# Patient Record
Sex: MALE | Race: WHITE | NOT HISPANIC OR LATINO | Employment: FULL TIME | URBAN - METROPOLITAN AREA
[De-identification: names, ages, dates, MRNs, and addresses within clinical notes are randomized per-mention and may not be internally consistent; named-entity substitution may affect disease eponyms.]

---

## 2017-06-07 ENCOUNTER — HOSPITAL ENCOUNTER (EMERGENCY)
Facility: MEDICAL CENTER | Age: 42
End: 2017-06-07
Attending: EMERGENCY MEDICINE
Payer: COMMERCIAL

## 2017-06-07 ENCOUNTER — APPOINTMENT (OUTPATIENT)
Dept: RADIOLOGY | Facility: MEDICAL CENTER | Age: 42
End: 2017-06-07
Attending: EMERGENCY MEDICINE
Payer: COMMERCIAL

## 2017-06-07 VITALS
RESPIRATION RATE: 18 BRPM | SYSTOLIC BLOOD PRESSURE: 144 MMHG | WEIGHT: 241.18 LBS | HEIGHT: 69 IN | OXYGEN SATURATION: 96 % | DIASTOLIC BLOOD PRESSURE: 99 MMHG | BODY MASS INDEX: 35.72 KG/M2 | HEART RATE: 58 BPM | TEMPERATURE: 98.2 F

## 2017-06-07 DIAGNOSIS — M26.622 ARTHRALGIA OF LEFT TEMPOROMANDIBULAR JOINT: ICD-10-CM

## 2017-06-07 DIAGNOSIS — M54.2 NECK PAIN ON LEFT SIDE: ICD-10-CM

## 2017-06-07 DIAGNOSIS — R51.9 HEADACHE, UNSPECIFIED HEADACHE TYPE: ICD-10-CM

## 2017-06-07 PROCEDURE — 72125 CT NECK SPINE W/O DYE: CPT

## 2017-06-07 PROCEDURE — 99284 EMERGENCY DEPT VISIT MOD MDM: CPT

## 2017-06-07 PROCEDURE — 70450 CT HEAD/BRAIN W/O DYE: CPT

## 2017-06-07 RX ORDER — METHYLPREDNISOLONE 4 MG/1
TABLET ORAL
Qty: 1 KIT | Refills: 0 | Status: SHIPPED | OUTPATIENT
Start: 2017-06-07 | End: 2017-11-02

## 2017-06-07 RX ORDER — HYDROCODONE BITARTRATE AND ACETAMINOPHEN 10; 325 MG/1; MG/1
1 TABLET ORAL EVERY 6 HOURS PRN
Qty: 16 TAB | Refills: 0 | Status: SHIPPED | OUTPATIENT
Start: 2017-06-07 | End: 2017-11-02

## 2017-06-07 RX ORDER — CYCLOBENZAPRINE HCL 10 MG
10 TABLET ORAL 3 TIMES DAILY PRN
Qty: 15 TAB | Refills: 1 | Status: SHIPPED | OUTPATIENT
Start: 2017-06-07 | End: 2017-11-02

## 2017-06-07 ASSESSMENT — PAIN SCALES - GENERAL: PAINLEVEL_OUTOF10: 8

## 2017-06-07 NOTE — ED AVS SNAPSHOT
6/7/2017    Dami Brody  [address] [facility] [place] [address]    Dear Dami:    Psychiatric hospital wants to ensure your discharge home is safe and you or your loved ones have had all of your questions answered regarding your care after you leave the hospital.    Below is a list of resources and contact information should you have any questions regarding your hospital stay, follow-up instructions, or active medical symptoms.    Questions or Concerns Regarding… Contact   Medical Questions Related to Your Discharge  (7 days a week, 8am-5pm) Contact a Nurse Care Coordinator   [phone]   Medical Questions Not Related to Your Discharge  (24 hours a day / 7 days a week)  Contact the Nurse Health Line   [phone]    Medications or Discharge Instructions Refer to your discharge packet   or contact your [facility] Primary Care Provider   [phone]   Follow-up Appointment(s) Schedule your appointment via [facility]   or contact Scheduling [phone]   Billing Review your statement via [facility]  or contact Billing [phone]   Medical Records Review your records via [facility]   or contact Medical Records [phone]     You may receive a telephone call within two days of discharge. This call is to make certain you understand your discharge instructions and have the opportunity to have any questions answered. You can also easily access your medical information, test results and upcoming appointments via the [facility] free online health management tool. You can learn more and sign up at [facility]. For assistance setting up your [facility] account, please call [phone].    Once again, we want to ensure your discharge home is safe and that you have a clear understanding of any next steps in your care. If you have any questions or concerns, please do not hesitate to contact us, we are here for you. Thank you for choosing [facility] for your healthcare needs.    Sincerely,    Your [facility] Healthcare Team

## 2017-06-07 NOTE — ED NOTES
"Pt saw dentist this week for c/o of possible dental infection.  Pt states was told does not have any cartilage on Left side of jaw and given referral for oral surgeon.  Pt stated \"I just want to make sure that's what it is.\"   "

## 2017-06-07 NOTE — ED AVS SNAPSHOT
Home Care Instructions                                                                                                                Dami Brody   MRN: [id number]    Department:  [facility], Emergency Dept   Date of Visit:  6/7/2017            [facility], Emergency Dept    [address] [place] [address]    Phone:  [phone]      You were seen by     Daryl Barnes M.D.      Your Diagnosis Was     Arthralgia of left temporomandibular joint     M26.622       Follow-up Information     1. Follow up with Nilton Newman M.D..    Specialty:  Family Medicine    Why:  see the oral surgeon as scheduled.  If still needed, follow up with primary doctor and ear nose throat specialist for recheck.    Contact information    [address]  [address]  "[employer] [id number] [phone]          2. Follow up with Karolina Beasley M.D..    Specialty:  Otolaryngology    Why:  if still needed for evaluation of neck and facial pain following visit to the oral surgeon, call for appointment with ear nose throat specialist    Contact information    [address] [employer] [id number] [phone]        Medication Information     Review all of your home medications and newly ordered medications with your primary doctor and/or pharmacist as soon as possible. Follow medication instructions as directed by your doctor and/or pharmacist.     Please keep your complete medication list with you and share with your physician. Update the information when medications are discontinued, doses are changed, or new medications (including over-the-counter products) are added; and carry medication information at all times in the event of emergency situations.               Medication List      START taking these medications        Instructions    Morning Afternoon Evening Bedtime    cyclobenzaprine 10 MG Tabs   Commonly known as:  FLEXERIL        Take 1 Tab by mouth 3 times a day as needed  for Muscle Spasms.   Dose:  10 mg                        hydrocodone/acetaminophen  MG Tabs   Commonly known as:  NORCO        Take 1 Tab by mouth every 6 hours as needed for Moderate Pain.   Dose:  1 Tab                        MethylPREDNISolone 4 MG Tbpk   Commonly known as:  MEDROL DOSEPAK        Take as directed                          ASK your doctor about these medications        Instructions    Morning Afternoon Evening Bedtime    acetaminophen 500 MG Tabs   Commonly known as:  TYLENOL        Take 1,000 mg by mouth Once. Indications: Pain   Dose:  1000 mg                        albuterol 108 (90 BASE) MCG/ACT Aers inhalation aerosol        Inhale 1-2 Puffs by mouth every 6 hours as needed for Shortness of Breath.   Dose:  1-2 Puff                        fluticasone 50 MCG/ACT nasal spray   Commonly known as:  FLONASE        Spray 1 Spray in nose every day.   Dose:  1 Spray                             Where to Get Your Medications      You can get these medications from any pharmacy     Bring a paper prescription for each of these medications    - cyclobenzaprine 10 MG Tabs  - hydrocodone/acetaminophen  MG Tabs  - MethylPREDNISolone 4 MG Tbpk            Procedures and tests performed during your visit     CT-CSPINE WITHOUT PLUS RECONS    CT-HEAD W/O        Discharge Instructions       Temporomandibular Joint Syndrome  Temporomandibular joint (TMJ) syndrome is a condition that affects the joints between your jaw and your skull. The TMJs are located near your ears and allow your jaw to open and close. These joints and the nearby muscles are involved in all movements of the jaw. People with TMJ syndrome have pain in the area of these joints and muscles. Chewing, biting, or other movements of the jaw can be difficult or painful.  TMJ syndrome can be caused by various things. In many cases, the condition is mild and goes away within a few weeks. For some people, the condition can become a long-term  problem.  CAUSES  Possible causes of TMJ syndrome include:  · Grinding your teeth or clenching your jaw. Some people do this when they are under stress.  · Arthritis.  · Injury to the jaw.  · Head or neck injury.  · Teeth or dentures that are not aligned well.  In some cases, the cause of TMJ syndrome may not be known.  SIGNS AND SYMPTOMS  The most common symptom is an aching pain on the side of the head in the area of the TMJ. Other symptoms may include:  · Pain when moving your jaw, such as when chewing or biting.  · Being unable to open your jaw all the way.  · Making a clicking sound when you open your mouth.  · Headache.  · Earache.  · Neck or shoulder pain.  DIAGNOSIS  Diagnosis can usually be made based on your symptoms, your medical history, and a physical exam. Your health care provider may check the range of motion of your jaw. Imaging tests, such as X-rays or an MRI, are sometimes done. You may need to see your dentist to determine if your teeth and jaw are lined up correctly.  TREATMENT  TMJ syndrome often goes away on its own. If treatment is needed, the options may include:  · Eating soft foods and applying ice or heat.  · Medicines to relieve pain or inflammation.  · Medicines to relax the muscles.  · A splint, bite plate, or mouthpiece to prevent teeth grinding or jaw clenching.  · Relaxation techniques or counseling to help reduce stress.  · Transcutaneous electrical nerve stimulation (TENS). This helps to relieve pain by applying an electrical current through the skin.  · Acupuncture. This is sometimes helpful to relieve pain.  · Jaw surgery. This is rarely needed.  HOME CARE INSTRUCTIONS  · Take medicines only as directed by your health care provider.  · Eat a soft diet if you are having trouble chewing.  · Apply ice to the painful area.  ¨ Put ice in a plastic bag.  ¨ Place a towel between your skin and the bag.  ¨ Leave the ice on for 20 minutes, 2-3 times a day.  · Apply a warm compress to the  painful area as directed.  · Massage your jaw area and perform any jaw stretching exercises as recommended by your health care provider.  · If you were given a mouthpiece or bite plate, wear it as directed.  · Avoid foods that require a lot of chewing. Do not chew gum.  · Keep all follow-up visits as directed by your health care provider. This is important.  SEEK MEDICAL CARE IF:  · You are having trouble eating.  · You have new or worsening symptoms.  SEEK IMMEDIATE MEDICAL CARE IF:  · Your jaw locks open or closed.     This information is not intended to replace advice given to you by your health care provider. Make sure you discuss any questions you have with your health care provider.     Document Released: 09/12/2002 Document Revised: 01/08/2016 Document Reviewed: 07/23/2015  [employer] Interactive Patient Education ©2016 [employer]            Patient Information     Patient Information    Following emergency treatment: all patient requiring follow-up care must return either to a private physician or a clinic if your condition worsens before you are able to obtain further medical attention, please return to the emergency room.     Billing Information    At [facility], we work to make the billing process streamlined for our patients.  Our Representatives are here to answer any questions you may have regarding your hospital bill.  If you have insurance coverage and have supplied your insurance information to us, we will submit a claim to your insurer on your behalf.  Should you have any questions regarding your bill, we can be reached online or by phone as follows:  Online: You are able pay your bills online or live chat with our representatives about any billing questions you may have. We are here to help Monday - Friday from 8:00am to 7:30pm and 9:00am - 12:00pm on Saturdays.  Please visit [email or web address]  for more information.   Phone:  [phone] or  [phone]    Please note that your emergency physician, surgeon, pathologist, radiologist, anesthesiologist, and other specialists are not employed by [facility] and will therefore bill separately for their services.  Please contact them directly for any questions concerning their bills at the numbers below:     Emergency Physician Services:  [phone]  [facility]:  [phone]  [facility]:  [phone]  [facility]:  [phone]    1. Your final bill may vary from the amount quoted upon discharge if all procedures are not complete at that time, or if your doctor has additional procedures of which we are not aware. You will receive an additional bill if you return to the Emergency Department at [facility] for suture removal regardless of the facility of which the sutures were placed.     2. Please arrange for settlement of this account at the emergency registration.    3. All self-pay accounts are due in full at the time of treatment.  If you are unable to meet this obligation then payment is expected within 4-5 days.     4. If you have had radiology studies (CT, X-ray, Ultrasound, MRI), you have received a preliminary result during your emergency department visit. Please contact the radiology department [phone] to receive a copy of your final result. Please discuss the Final result with your primary physician or with the follow up physician provided.     Crisis Hotline:  [place] Crisis Hotline:  8-176-MPXNDZQ or 1-212.862.8726  [place] Crisis Hotline:    1-721.127.1943 or 417-270-4773         ED Discharge Follow Up Questions    1. In order to provide you with very good care, we would like to follow up with a phone call in the next few days.  May we have your permission to contact you?     YES /  NO    2. What is the best phone number to call you? (       )_____-__________    3. What is the best time to call you?      Morning  /  Afternoon  /   Evening                   Patient Signature:  ____________________________________________________________    Date:  ____________________________________________________________

## 2017-06-07 NOTE — ED AVS SNAPSHOT
[employer] Access Code: [email or web address]  Expires: 7/7/2017  6:17 PM    Your email address is not on file at [facility].  Email Addresses are required for you to sign up for [employer], please contact [phone] to verify your personal information and to provide your email address prior to attempting to register for [employer].    Bernard Lewis  [address] [place] [address]    [employer]  A secure, online tool to manage your health information     [facility]’s [employer]® is a secure, online tool that connects you to your personalized health information from the privacy of your home -- day or night - making it very easy for you to manage your healthcare. Once the activation process is completed, you can even access your medical information using the [employer] selene, which is available for free in the Apple Selene store or Google Play store.     To learn more about [employer], visit [email or web address]    There are two levels of access available (as shown below):   My Chart Features  [facility] Primary Care Doctor [facility]  Specialists [facility]  Urgent  Care Non-[facility] Primary Care Doctor   Email your healthcare team securely and privately 24/7 X X X    Manage appointments: schedule your next appointment; view details of past/upcoming appointments X      Request prescription refills. X      View recent personal medical records, including lab and immunizations X X X X   View health record, including health history, allergies, medications X X X X   Read reports about your outpatient visits, procedures, consult and ER notes X X X X   See your discharge summary, which is a recap of your hospital and/or ER visit that includes your diagnosis, lab results, and care plan X X  X     How to register for [employer]:  Once your e-mail address has been verified, follow the following steps to sign up for [employer].     1. Go to  [email or web address]  2. Click on the Sign Up Now box, which takes you to the New Member Sign Up page. You  will need to provide the following information:  a. Enter your [employer] Access Code exactly as it appears at the top of this page. (You will not need to use this code after you’ve completed the sign-up process. If you do not sign up before the expiration date, you must request a new code.)   b. Enter your date of birth.   c. Enter your home email address.   d. Click Submit, and follow the next screen’s instructions.  3. Create a [employer] ID. This will be your [employer] login ID and cannot be changed, so think of one that is secure and easy to remember.  4. Create a [employer] password. You can change your password at any time.  5. Enter your Password Reset Question and Answer. This can be used at a later time if you forget your password.   6. Enter your e-mail address. This allows you to receive e-mail notifications when new information is available in [employer].  7. Click Sign Up. You can now view your health information.    For assistance activating your [employer] account, call [phone]

## 2017-06-07 NOTE — ED NOTES
"Chief Complaint   Patient presents with   • Ear Pain     Left, x 1 month   • Neck Pain     x 1 month   • Back Pain     x 1 month     /88 mmHg  Pulse 75  Temp(Src) 36.8 °C (98.2 °F)  Resp 16  Ht 1.753 m (5' 9\")  Wt 109.4 kg (241 lb 2.9 oz)  BMI 35.60 kg/m2  SpO2 94%    "

## 2017-06-08 NOTE — DISCHARGE INSTRUCTIONS
Temporomandibular Joint Syndrome  Temporomandibular joint (TMJ) syndrome is a condition that affects the joints between your jaw and your skull. The TMJs are located near your ears and allow your jaw to open and close. These joints and the nearby muscles are involved in all movements of the jaw. People with TMJ syndrome have pain in the area of these joints and muscles. Chewing, biting, or other movements of the jaw can be difficult or painful.  TMJ syndrome can be caused by various things. In many cases, the condition is mild and goes away within a few weeks. For some people, the condition can become a long-term problem.  CAUSES  Possible causes of TMJ syndrome include:  · Grinding your teeth or clenching your jaw. Some people do this when they are under stress.  · Arthritis.  · Injury to the jaw.  · Head or neck injury.  · Teeth or dentures that are not aligned well.  In some cases, the cause of TMJ syndrome may not be known.  SIGNS AND SYMPTOMS  The most common symptom is an aching pain on the side of the head in the area of the TMJ. Other symptoms may include:  · Pain when moving your jaw, such as when chewing or biting.  · Being unable to open your jaw all the way.  · Making a clicking sound when you open your mouth.  · Headache.  · Earache.  · Neck or shoulder pain.  DIAGNOSIS  Diagnosis can usually be made based on your symptoms, your medical history, and a physical exam. Your health care provider may check the range of motion of your jaw. Imaging tests, such as X-rays or an MRI, are sometimes done. You may need to see your dentist to determine if your teeth and jaw are lined up correctly.  TREATMENT  TMJ syndrome often goes away on its own. If treatment is needed, the options may include:  · Eating soft foods and applying ice or heat.  · Medicines to relieve pain or inflammation.  · Medicines to relax the muscles.  · A splint, bite plate, or mouthpiece to prevent teeth grinding or jaw  clenching.  · Relaxation techniques or counseling to help reduce stress.  · Transcutaneous electrical nerve stimulation (TENS). This helps to relieve pain by applying an electrical current through the skin.  · Acupuncture. This is sometimes helpful to relieve pain.  · Jaw surgery. This is rarely needed.  HOME CARE INSTRUCTIONS  · Take medicines only as directed by your health care provider.  · Eat a soft diet if you are having trouble chewing.  · Apply ice to the painful area.  ¨ Put ice in a plastic bag.  ¨ Place a towel between your skin and the bag.  ¨ Leave the ice on for 20 minutes, 2-3 times a day.  · Apply a warm compress to the painful area as directed.  · Massage your jaw area and perform any jaw stretching exercises as recommended by your health care provider.  · If you were given a mouthpiece or bite plate, wear it as directed.  · Avoid foods that require a lot of chewing. Do not chew gum.  · Keep all follow-up visits as directed by your health care provider. This is important.  SEEK MEDICAL CARE IF:  · You are having trouble eating.  · You have new or worsening symptoms.  SEEK IMMEDIATE MEDICAL CARE IF:  · Your jaw locks open or closed.     This information is not intended to replace advice given to you by your health care provider. Make sure you discuss any questions you have with your health care provider.     Document Released: 09/12/2002 Document Revised: 01/08/2016 Document Reviewed: 07/23/2015  [employer] Interactive Patient Education ©2016 [employer]

## 2017-06-08 NOTE — ED PROVIDER NOTES
ED Provider Note    CHIEF COMPLAINT  Chief Complaint   Patient presents with   • Ear Pain     Left, x 1 month   • Neck Pain     x 1 month   • Back Pain     x 1 month       HPI  Dami Brody is a 42 y.o. male who presents complaining of pain greatest over the left TMJ.  He states he saw a dentist as he thought he may have infected tooth.  He states that he received a panoramic mandibular x-ray which showed severe disease of the left TMJ however no evidence of abscess.  Patient states the dentist told him he needed to see an oral surgeon for his problem and this appointment is a week from tomorrow.  Patient has had 2 weeks of rolling left-sided neck pain, muscle spasm, as well as left-sided headaches.  No vision change.  No numbness or weakness of extremities.  He denies clumsiness with his hands.  No chest pain, no pleurisy.  Patient is willing to follow up with the oral surgeon but stated he wanted another evaluation this evening.    REVIEW OF SYSTEMS  Constitutional: No fever  Respiratory: No shortness of breath  ENT left facial pain  Neurologic: Headache  Gastrointestinal: No abdominal pain  Musculoskeletal: Left-sided neck pain    PAST MEDICAL HISTORY  History reviewed. No pertinent past medical history.    FAMILY HISTORY  History reviewed. No pertinent family history.    SOCIAL HISTORY  Social History     Social History   • Marital Status:      Spouse Name: N/A   • Number of Children: N/A   • Years of Education: N/A     Social History Main Topics   • Smoking status: Never Smoker    • Smokeless tobacco: Never Used   • Alcohol Use: Yes      Comment: Occasionally   • Drug Use: No   • Sexual Activity: Not Asked     Other Topics Concern   • None     Social History Narrative       SURGICAL HISTORY  Past Surgical History   Procedure Laterality Date   • Appendectomy         CURRENT MEDICATIONS  No current facility-administered medications on file prior to encounter.     Current Outpatient Prescriptions on File  "Prior to Encounter   Medication Sig Dispense Refill   • albuterol 108 (90 BASE) MCG/ACT Aero Soln inhalation aerosol Inhale 1-2 Puffs by mouth every 6 hours as needed for Shortness of Breath.     • fluticasone (FLONASE) 50 MCG/ACT nasal spray Spray 1 Spray in nose every day.     • acetaminophen (TYLENOL) 500 MG Tab Take 1,000 mg by mouth Once. Indications: Pain         ALLERGIES  Allergies   Allergen Reactions   • Clindamycin    • Erythromycin    • Septra [Bactrim]    • Sulfa Drugs    • Tramadol      \"makes me feel sick & disoriented\"       PHYSICAL EXAM  VITAL SIGNS: /99 mmHg  Pulse 58  Temp(Src) 36.8 °C (98.2 °F)  Resp 18  Ht 1.753 m (5' 9\")  Wt 109.4 kg (241 lb 2.9 oz)  BMI 35.60 kg/m2  SpO2 96%  Constitutional:  Well nourished, No acute distress.   HENT: Tenderness over the left TMJ.  No external swelling.  No dental tenderness.  Gingiva normal.  Posterior pharynx normal.  Lymphatics: No submandibular adenopathy  Eyes:  Conjunctiva normal, No discharge.    Cardiovascular: The heart is regular, no murmurs  Pulmonary: Lungs are clear, good air movement  Skin: No cyanosis.   Musculoskeletal: Neck nontender   Neurologic: speech is clear, no ataxia .  Facial expression normal.  Facial sensation is normal.  Strength and sensation normal in the extremities  Psychiatric:  Mood normal.       CT-CSPINE WITHOUT PLUS RECONS   Final Result         1.  Negative CT scan of the cervical spine.  No fracture or subluxation.      2.  No degenerative change.      3.  Minimal cervical scoliosis convex left which may be secondary to spasm.      CT-HEAD W/O   Final Result      No evidence of acute intracranial process.            COURSE & MEDICAL DECISION MAKING  Pertinent Labs & Imaging studies reviewed. (See chart for details)  Patient some muscular spasm, pain is now been ongoing for a month and certainly the changes told to him by his dentist are likely chronic in nature, recently worsening.  Patient will continue to " take hydrocodone.  He has been prescribed a Medrol Dosepak as well as Flexeril for muscle spasm.  No fever, no flexion or extension of pain or stiffness, there is rotational stiffness and pain to the left lateral neck, unlikely meningitis.  Patient requested and has received referral to ENT if evaluation by oral surgeon is unremarkable for ongoing workup.    FINAL IMPRESSION     1. Arthralgia of left temporomandibular joint    2. Headache, unspecified headache type    3. Neck pain on left side                 Electronically signed by: Daryl Barnes, 6/7/2017 10:07 PM

## 2017-06-08 NOTE — ED NOTES
Discharged home in good condition with prescriptions and follow up instructions, pt verbalizes understanding of all, ambulates out with steady gait accompanied by self.  Pt instructed not to drive while taking narcotics, verbalizes understanding.

## 2017-11-02 ENCOUNTER — OFFICE VISIT (OUTPATIENT)
Dept: URGENT CARE | Facility: CLINIC | Age: 42
End: 2017-11-02
Payer: COMMERCIAL

## 2017-11-02 VITALS
SYSTOLIC BLOOD PRESSURE: 126 MMHG | TEMPERATURE: 98.2 F | RESPIRATION RATE: 18 BRPM | WEIGHT: 235 LBS | BODY MASS INDEX: 34.8 KG/M2 | OXYGEN SATURATION: 97 % | DIASTOLIC BLOOD PRESSURE: 74 MMHG | HEART RATE: 86 BPM | HEIGHT: 69 IN

## 2017-11-02 DIAGNOSIS — E66.9 OBESITY (BMI 30-39.9): ICD-10-CM

## 2017-11-02 DIAGNOSIS — L73.9 FOLLICULITIS: Primary | ICD-10-CM

## 2017-11-02 PROCEDURE — 99204 OFFICE O/P NEW MOD 45 MIN: CPT | Performed by: PHYSICIAN ASSISTANT

## 2017-11-02 RX ORDER — CEPHALEXIN 500 MG/1
500 CAPSULE ORAL 3 TIMES DAILY
Qty: 30 CAP | Refills: 0 | Status: SHIPPED | OUTPATIENT
Start: 2017-11-02 | End: 2017-11-12

## 2017-11-02 RX ORDER — METHYLPREDNISOLONE 4 MG/1
TABLET ORAL
Qty: 21 TAB | Refills: 0 | Status: SHIPPED | OUTPATIENT
Start: 2017-11-02 | End: 2019-09-22

## 2017-11-02 ASSESSMENT — PAIN SCALES - GENERAL: PAINLEVEL: 6=MODERATE PAIN

## 2017-11-03 NOTE — PROGRESS NOTES
Subjective:      Pt is a 42 y.o. male who presents with Rash (pimples and itching through out the entire scalp, feeling tingling sensation )            HPI  Pt notes 7-10 days of red, itchy pimples across entire scalp region wherever there is hair but not in areas without hair. Pt notes this has recurred in the past at this time of year but is unaware of the trigger. Pt has not taken any Rx medications for this condition. Pt states the pain is a 5/10 with itching, aching in nature and worse at night. Pt denies CP, SOB, NVD, paresthesias, headaches, dizziness, change in vision, hives, or other joint pain. The pt's medication list, problem list, and allergies have been evaluated and reviewed during today's visit.    PMH:  Negative per pt.      PSH:  Past Surgical History:   Procedure Laterality Date   • APPENDECTOMY         Fam Hx:  the patient's family history is not pertinent to their current complaint      Soc HX:  Social History     Social History   • Marital status:      Spouse name: N/A   • Number of children: N/A   • Years of education: N/A     Occupational History   • Not on file.     Social History Main Topics   • Smoking status: Never Smoker   • Smokeless tobacco: Never Used   • Alcohol use Yes      Comment: Occasionally   • Drug use: No   • Sexual activity: Not on file     Other Topics Concern   • Not on file     Social History Narrative   • No narrative on file         Medications:    Current Outpatient Prescriptions:   •  ESOMEPRAZOLE MAGNESIUM PO, Take  by mouth., Disp: , Rfl:   •  MethylPREDNISolone (MEDROL DOSEPAK) 4 MG Tablet Therapy Pack, Use as directed, Disp: 21 Tab, Rfl: 0  •  cephALEXin (KEFLEX) 500 MG Cap, Take 1 Cap by mouth 3 times a day for 10 days., Disp: 30 Cap, Rfl: 0  •  albuterol 108 (90 BASE) MCG/ACT Aero Soln inhalation aerosol, Inhale 1-2 Puffs by mouth every 6 hours as needed for Shortness of Breath., Disp: , Rfl:   •  fluticasone (FLONASE) 50 MCG/ACT nasal spray, Spray 1  "Spray in nose every day., Disp: , Rfl:   •  acetaminophen (TYLENOL) 500 MG Tab, Take 1,000 mg by mouth Once. Indications: Pain, Disp: , Rfl:       Allergies:  Clindamycin; Erythromycin; Septra [bactrim]; Sulfa drugs; and Tramadol      ROS  Constitutional: Negative for fever, chills and malaise/fatigue.   HENT: Negative for congestion and sore throat.    Eyes: Negative for blurred vision, double vision and photophobia.   Respiratory: Negative for cough and shortness of breath.    Cardiovascular: Negative for chest pain and palpitations.   Gastrointestinal: Negative for heartburn, nausea, vomiting, abdominal pain, diarrhea and constipation.   Genitourinary: Negative for dysuria and flank pain.   Musculoskeletal: Negative for joint pain and myalgias.   Skin: POS for scalp itching and rash.   Neurological: Negative for dizziness, tingling and headaches.   Endo/Heme/Allergies: Does not bruise/bleed easily.   Psychiatric/Behavioral: Negative for depression. The patient is not nervous/anxious.           Objective:     /74   Pulse 86   Temp 36.8 °C (98.2 °F)   Resp 18   Ht 1.753 m (5' 9\")   Wt 106.6 kg (235 lb)   SpO2 97%   BMI 34.70 kg/m²      Physical Exam   HENT:   Head:             Constitutional: PT is oriented to person, place, and time. PT appears well-developed and well-nourished. No distress.   HENT:   Mouth/Throat: Oropharynx is clear and moist. No oropharyngeal exudate.   Eyes: Conjunctivae normal and EOM are normal. Pupils are equal, round, and reactive to light.   Neck: Normal range of motion. Neck supple. No thyromegaly present.   Cardiovascular: Normal rate, regular rhythm, normal heart sounds and intact distal pulses.  Exam reveals no gallop and no friction rub.    No murmur heard.  Pulmonary/Chest: Effort normal and breath sounds normal. No respiratory distress. PT has no wheezes. PT has no rales. Pt exhibits no tenderness.   Abdominal: Soft. Bowel sounds are normal. PT exhibits no distension " and no mass. There is no tenderness. There is no rebound and no guarding.   Musculoskeletal: Normal range of motion. PT exhibits no edema and no tenderness.   Neurological: PT is alert and oriented to person, place, and time. PT has normal reflexes. No cranial nerve deficit.   Skin: Skin is warm and dry. No rash noted. PT is not diaphoretic. No erythema.       Psychiatric: PT has a normal mood and affect. PT behavior is normal. Judgment and thought content normal.          Assessment/Plan:     1. Folliculitis, scalp    - MethylPREDNISolone (MEDROL DOSEPAK) 4 MG Tablet Therapy Pack; Use as directed  Dispense: 21 Tab; Refill: 0  - cephALEXin (KEFLEX) 500 MG Cap; Take 1 Cap by mouth 3 times a day for 10 days.  Dispense: 30 Cap; Refill: 0    2. Obesity (BMI 30-39.9)    Based on the electronic medical record calculations for BMI: the pt was diagnosed with obesity. Obesity counseling discussed concerning diet and exercise improvements. Pt was in full understanding with information given and plan set forth.    Might be form of Hidradenitis suppurativa     This was discussed with the pt.  Rest, fluids encouraged.  AVS with medical info given.  Pt was in full understanding and agreement with the plan.  Follow-up as needed if symptoms worsen or fail to improve.

## 2017-11-03 NOTE — PATIENT INSTRUCTIONS
Hidradenitis Suppurativa  Hidradenitis suppurativa is a long-term (chronic) skin disease that starts with blocked sweat glands or hair follicles. Bacteria may grow in these blocked openings of your skin. Hidradenitis suppurativa is like a severe form of acne that develops in areas of your body where acne would be unusual. It is most likely to affect the areas of your body where skin rubs against skin and becomes moist. This includes your:  · Underarms.  · Groin.  · Genital areas.  · Buttocks.  · Upper thighs.  · Breasts.  Hidradenitis suppurativa may start out with small pimples. The pimples can develop into deep sores that break open (rupture) and drain pus. Over time your skin may thicken and become scarred. Hidradenitis suppurativa cannot be passed from person to person.   CAUSES   The exact cause of hidradenitis suppurativa is not known. This condition may be due to:  · Male and female hormones. The condition is rare before and after puberty.  · An overactive body defense system (immune system). Your immune system may overreact to the blocked hair follicles or sweat glands and cause swelling and pus-filled sores.  RISK FACTORS  You may have a higher risk of hidradenitis suppurativa if you:  · Are a woman.  · Are between ages 11 and 55.  · Have a family history of hidradenitis suppurativa.  · Have a personal history of acne.  · Are overweight.  · Smoke.  · Take the drug lithium.  SIGNS AND SYMPTOMS   The first signs of an outbreak are usually painful skin bumps that look like pimples. As the condition progresses:  · Skin bumps may get bigger and grow deeper into the skin.  · Bumps under the skin may rupture and drain smelly pus.  · Skin may become itchy and infected.  · Skin may thicken and scar.  · Drainage may continue through tunnels under the skin (fistulas).  · Walking and moving your arms can become painful.  DIAGNOSIS   Your health care provider may diagnose hidradenitis suppurativa based on your medical  history and your signs and symptoms. A physical exam will also be done. You may need to see a health care provider who specializes in skin diseases (dermatologist). You may also have tests done to confirm the diagnosis. These can include:  · Swabbing a sample of pus or drainage from your skin so it can be sent to the lab and tested for infection.  · Blood tests to check for infection.  TREATMENT   The same treatment will not work for everybody with hidradenitis suppurativa. Your treatment will depend on how severe your symptoms are. You may need to try several treatments to find what works best for you. Part of your treatment may include cleaning and bandaging (dressing) your wounds. You may also have to take medicines, such as the following:  · Antibiotics.  · Acne medicines.  · Medicines to block or suppress the immune system.  · A diabetes medicine (metformin) is sometimes used to treat this condition.  · For women, birth control pills can sometimes help relieve symptoms.  You may need surgery if you have a severe case of hidradenitis suppurativa that does not respond to medicine. Surgery may involve:   · Using a laser to clear the skin and remove hair follicles.  · Opening and draining deep sores.  · Removing the areas of skin that are diseased and scarred.  HOME CARE INSTRUCTIONS  · Learn as much as you can about your disease, and work closely with your health care providers.  · Take medicines only as directed by your health care provider.  · If you were prescribed an antibiotic medicine, finish it all even if you start to feel better.  · If you are overweight, losing weight may be very helpful. Try to reach and maintain a healthy weight.  · Do not use any tobacco products, including cigarettes, chewing tobacco, or electronic cigarettes. If you need help quitting, ask your health care provider.  · Do not shave the areas where you get hidradenitis suppurativa.  · Do not wear deodorant.  · Wear loose-fitting  clothes.  · Try not to overheat and get sweaty.  · Take a daily bleach bath as directed by your health care provider.  ¨ Fill your bathtub residential with water.  ¨ Pour in ½ cup of unscented household bleach.  ¨ Soak for 5-10 minutes.  · Cover sore areas with a warm, clean washcloth (compress) for 5-10 minutes.  SEEK MEDICAL CARE IF:   · You have a flare-up of hidradenitis suppurativa.  · You have chills or a fever.  · You are having trouble controlling your symptoms at home.     This information is not intended to replace advice given to you by your health care provider. Make sure you discuss any questions you have with your health care provider.     Document Released: 08/01/2005 Document Revised: 01/08/2016 Document Reviewed: 03/20/2015  [employer] Interactive Patient Education ©2016 [employer]

## 2019-09-22 ENCOUNTER — APPOINTMENT (OUTPATIENT)
Dept: RADIOLOGY | Facility: MEDICAL CENTER | Age: 44
End: 2019-09-22
Attending: EMERGENCY MEDICINE
Payer: COMMERCIAL

## 2019-09-22 ENCOUNTER — HOSPITAL ENCOUNTER (EMERGENCY)
Facility: MEDICAL CENTER | Age: 44
End: 2019-09-22
Attending: EMERGENCY MEDICINE
Payer: COMMERCIAL

## 2019-09-22 VITALS
OXYGEN SATURATION: 94 % | HEIGHT: 69 IN | HEART RATE: 77 BPM | TEMPERATURE: 98 F | BODY MASS INDEX: 39.18 KG/M2 | WEIGHT: 264.55 LBS | RESPIRATION RATE: 17 BRPM | DIASTOLIC BLOOD PRESSURE: 74 MMHG | SYSTOLIC BLOOD PRESSURE: 135 MMHG

## 2019-09-22 DIAGNOSIS — R10.9 FLANK PAIN: ICD-10-CM

## 2019-09-22 LAB
ALBUMIN SERPL BCP-MCNC: 4.4 G/DL (ref 3.2–4.9)
ALBUMIN/GLOB SERPL: 1.6 G/DL
ALP SERPL-CCNC: 58 U/L (ref 30–99)
ALT SERPL-CCNC: 18 U/L (ref 2–50)
ANION GAP SERPL CALC-SCNC: 14 MMOL/L (ref 0–11.9)
APPEARANCE UR: CLEAR
AST SERPL-CCNC: 13 U/L (ref 12–45)
BASOPHILS # BLD AUTO: 0.7 % (ref 0–1.8)
BASOPHILS # BLD: 0.06 K/UL (ref 0–0.12)
BILIRUB SERPL-MCNC: 0.4 MG/DL (ref 0.1–1.5)
BILIRUB UR QL STRIP.AUTO: NEGATIVE
BUN SERPL-MCNC: 17 MG/DL (ref 8–22)
CALCIUM SERPL-MCNC: 9.4 MG/DL (ref 8.4–10.2)
CHLORIDE SERPL-SCNC: 104 MMOL/L (ref 96–112)
CO2 SERPL-SCNC: 22 MMOL/L (ref 20–33)
COLOR UR: YELLOW
CREAT SERPL-MCNC: 0.77 MG/DL (ref 0.5–1.4)
EOSINOPHIL # BLD AUTO: 0.25 K/UL (ref 0–0.51)
EOSINOPHIL NFR BLD: 2.8 % (ref 0–6.9)
ERYTHROCYTE [DISTWIDTH] IN BLOOD BY AUTOMATED COUNT: 39.1 FL (ref 35.9–50)
GLOBULIN SER CALC-MCNC: 2.7 G/DL (ref 1.9–3.5)
GLUCOSE SERPL-MCNC: 131 MG/DL (ref 65–99)
GLUCOSE UR STRIP.AUTO-MCNC: NEGATIVE MG/DL
HCT VFR BLD AUTO: 43.3 % (ref 42–52)
HGB BLD-MCNC: 14.6 G/DL (ref 14–18)
IMM GRANULOCYTES # BLD AUTO: 0.05 K/UL (ref 0–0.11)
IMM GRANULOCYTES NFR BLD AUTO: 0.6 % (ref 0–0.9)
KETONES UR STRIP.AUTO-MCNC: NEGATIVE MG/DL
LEUKOCYTE ESTERASE UR QL STRIP.AUTO: NEGATIVE
LIPASE SERPL-CCNC: 21 U/L (ref 7–58)
LYMPHOCYTES # BLD AUTO: 2.31 K/UL (ref 1–4.8)
LYMPHOCYTES NFR BLD: 26 % (ref 22–41)
MCH RBC QN AUTO: 28.4 PG (ref 27–33)
MCHC RBC AUTO-ENTMCNC: 33.7 G/DL (ref 33.7–35.3)
MCV RBC AUTO: 84.2 FL (ref 81.4–97.8)
MICRO URNS: NORMAL
MONOCYTES # BLD AUTO: 0.51 K/UL (ref 0–0.85)
MONOCYTES NFR BLD AUTO: 5.7 % (ref 0–13.4)
NEUTROPHILS # BLD AUTO: 5.72 K/UL (ref 1.82–7.42)
NEUTROPHILS NFR BLD: 64.2 % (ref 44–72)
NITRITE UR QL STRIP.AUTO: NEGATIVE
NRBC # BLD AUTO: 0 K/UL
NRBC BLD-RTO: 0 /100 WBC
PH UR STRIP.AUTO: 5.5 [PH] (ref 5–8)
PLATELET # BLD AUTO: 257 K/UL (ref 164–446)
PMV BLD AUTO: 9.8 FL (ref 9–12.9)
POTASSIUM SERPL-SCNC: 4.1 MMOL/L (ref 3.6–5.5)
PROT SERPL-MCNC: 7.1 G/DL (ref 6–8.2)
PROT UR QL STRIP: NEGATIVE MG/DL
RBC # BLD AUTO: 5.14 M/UL (ref 4.7–6.1)
RBC UR QL AUTO: NEGATIVE
SODIUM SERPL-SCNC: 140 MMOL/L (ref 135–145)
SP GR UR STRIP.AUTO: 1.02
WBC # BLD AUTO: 8.9 K/UL (ref 4.8–10.8)

## 2019-09-22 PROCEDURE — 700111 HCHG RX REV CODE 636 W/ 250 OVERRIDE (IP): Performed by: EMERGENCY MEDICINE

## 2019-09-22 PROCEDURE — 85025 COMPLETE CBC W/AUTO DIFF WBC: CPT

## 2019-09-22 PROCEDURE — 74177 CT ABD & PELVIS W/CONTRAST: CPT

## 2019-09-22 PROCEDURE — 700117 HCHG RX CONTRAST REV CODE 255: Performed by: EMERGENCY MEDICINE

## 2019-09-22 PROCEDURE — 700102 HCHG RX REV CODE 250 W/ 637 OVERRIDE(OP): Performed by: EMERGENCY MEDICINE

## 2019-09-22 PROCEDURE — A9270 NON-COVERED ITEM OR SERVICE: HCPCS | Performed by: EMERGENCY MEDICINE

## 2019-09-22 PROCEDURE — 81003 URINALYSIS AUTO W/O SCOPE: CPT

## 2019-09-22 PROCEDURE — 83690 ASSAY OF LIPASE: CPT

## 2019-09-22 PROCEDURE — 80053 COMPREHEN METABOLIC PANEL: CPT

## 2019-09-22 PROCEDURE — 96374 THER/PROPH/DIAG INJ IV PUSH: CPT

## 2019-09-22 PROCEDURE — 99285 EMERGENCY DEPT VISIT HI MDM: CPT

## 2019-09-22 RX ORDER — HYDROCODONE BITARTRATE AND ACETAMINOPHEN 10; 325 MG/1; MG/1
1 TABLET ORAL
Status: SHIPPED | COMMUNITY
End: 2022-12-19

## 2019-09-22 RX ORDER — RANITIDINE 150 MG/1
150 TABLET ORAL 2 TIMES DAILY
Status: SHIPPED | COMMUNITY
End: 2022-12-19

## 2019-09-22 RX ORDER — SUCRALFATE 1 G/1
1 TABLET ORAL
Status: SHIPPED | COMMUNITY
End: 2022-12-19

## 2019-09-22 RX ORDER — SUCRALFATE ORAL 1 G/10ML
1 SUSPENSION ORAL
Qty: 280 ML | Refills: 0 | Status: SHIPPED | OUTPATIENT
Start: 2019-09-22 | End: 2019-09-29

## 2019-09-22 RX ORDER — ONDANSETRON 2 MG/ML
4 INJECTION INTRAMUSCULAR; INTRAVENOUS ONCE
Status: COMPLETED | OUTPATIENT
Start: 2019-09-22 | End: 2019-09-22

## 2019-09-22 RX ADMIN — LIDOCAINE HYDROCHLORIDE 30 ML: 20 SOLUTION OROPHARYNGEAL at 15:56

## 2019-09-22 RX ADMIN — IOHEXOL 100 ML: 350 INJECTION, SOLUTION INTRAVENOUS at 16:33

## 2019-09-22 RX ADMIN — ONDANSETRON 4 MG: 2 INJECTION INTRAMUSCULAR; INTRAVENOUS at 16:14

## 2019-09-22 NOTE — ED NOTES
Med rec updated and complete  Allergies reviewed  Pt reports no vitamins   Pt reports no antibiotics in the last 2 weeks

## 2019-09-22 NOTE — ED TRIAGE NOTES
Pt to ed c/o luq pain , states ongoing for month,pain intermittent , worse after eating.  Seen by gi for same s/s  Pain worse last 3 days

## 2019-09-22 NOTE — ED PROVIDER NOTES
ED Provider Note    CHIEF COMPLAINT  Chief Complaint   Patient presents with   • LUQ Pain         HPI  Dami Brody is a 44 y.o. male who presents to the ED with left-sided flank pain.  The patient states he has been having a flank pain for the last 3 to 4 years.  The patient states is been increasing over the last 3 to 4 weeks.  The sharp pain, just underneath the rib cage on the left, some numbness and tingling in left posterior back, seems to get worse when he eats yogurt, better when he takes his Carafate, he has had an EGD previously, he states that it did not show many abnormalities.  He has not seen his GI doctor in a year and a half.  Patient states he is never had a colonoscopy.  He had a CT scan about 2 years ago.  No change with breathing, some worsening pain with movement.    REVIEW OF SYSTEMS  See HPI for further details. All other systems are negative.     PAST MEDICAL HISTORY  Past Medical History:   Diagnosis Date   • GERD (gastroesophageal reflux disease)        FAMILY HISTORY  No family history on file.    SOCIAL HISTORY  Social History     Socioeconomic History   • Marital status:      Spouse name: Not on file   • Number of children: Not on file   • Years of education: Not on file   • Highest education level: Not on file   Occupational History   • Not on file   Social Needs   • Financial resource strain: Not on file   • Food insecurity:     Worry: Not on file     Inability: Not on file   • Transportation needs:     Medical: Not on file     Non-medical: Not on file   Tobacco Use   • Smoking status: Never Smoker   • Smokeless tobacco: Never Used   Substance and Sexual Activity   • Alcohol use: Yes     Comment: Occasionally   • Drug use: No   • Sexual activity: Not on file   Lifestyle   • Physical activity:     Days per week: Not on file     Minutes per session: Not on file   • Stress: Not on file   Relationships   • Social connections:     Talks on phone: Not on file     Gets together:  "Not on file     Attends Anabaptist service: Not on file     Active member of club or organization: Not on file     Attends meetings of clubs or organizations: Not on file     Relationship status: Not on file   • Intimate partner violence:     Fear of current or ex partner: Not on file     Emotionally abused: Not on file     Physically abused: Not on file     Forced sexual activity: Not on file   Other Topics Concern   • Not on file   Social History Narrative   • Not on file       SURGICAL HISTORY  Past Surgical History:   Procedure Laterality Date   • APPENDECTOMY         CURRENT MEDICATIONS  Home Medications     Reviewed by Clay Miranda (Pharmacy Tech) on 09/22/19 at 1548  Med List Status: Complete   Medication Last Dose Status   HYDROcodone/acetaminophen (NORCO)  MG Tab 9/22/2019 Active   raNITidine (ZANTAC) 150 MG Tab 9/22/2019 Active   sucralfate (CARAFATE) 1 GM Tab 9/22/2019 Active                ALLERGIES  Allergies   Allergen Reactions   • Clindamycin Rash     .   • Erythromycin Rash     .   • Iodine Vomiting     \"IV contrast makes me projectile vomit. I need IV Zofran before and then it's ok\"   • Septra [Bactrim] Rash     .   • Sulfa Drugs Rash     .   • Tramadol      \"makes me feel sick & disoriented\"       PHYSICAL EXAM  VITAL SIGNS: /74   Pulse 77   Temp 36.6 °C (97.9 °F) (Temporal)   Resp 17   Ht 1.753 m (5' 9\")   Wt 120 kg (264 lb 8.8 oz)   SpO2 94%   BMI 39.07 kg/m²   Constitutional: Well developed, Well nourished, mild distress, Non-toxic appearance.   HENT: Normocephalic, Atraumatic, Bilateral external ears normal, Oropharynx clear, No oral exudates, Nose normal.   Eyes: PERRLA, EOMI, Conjunctiva normal, No discharge.   Neck: Normal range of motion, No tenderness, Supple, No stridor.   Lymphatic: No lymphadenopathy noted.   Cardiovascular: Normal heart rate, Normal rhythm.   Thorax & Lungs: Normal breath sounds, No respiratory distress, No wheezing, No chest tenderness. "   Abdomen: Tenderness palpation on the inferior aspect of the left rib cage also left upper quadrant, left flank, slight left paraspinal muscular, no rashes  Skin: Warm, Dry, No erythema, No rash.   Back: No tenderness, No CVA tenderness.   Extremities: Intact distal pulses, No edema, No tenderness.   Neurologic: Alert & oriented x 3, moves all extremities spontaneously.     RADIOLOGY/PROCEDURES  CT-ABDOMEN-PELVIS WITH   Final Result      Diverticulosis without evidence of diverticulitis.          Results for orders placed or performed during the hospital encounter of 09/22/19   CBC WITH DIFFERENTIAL   Result Value Ref Range    WBC 8.9 4.8 - 10.8 K/uL    RBC 5.14 4.70 - 6.10 M/uL    Hemoglobin 14.6 14.0 - 18.0 g/dL    Hematocrit 43.3 42.0 - 52.0 %    MCV 84.2 81.4 - 97.8 fL    MCH 28.4 27.0 - 33.0 pg    MCHC 33.7 33.7 - 35.3 g/dL    RDW 39.1 35.9 - 50.0 fL    Platelet Count 257 164 - 446 K/uL    MPV 9.8 9.0 - 12.9 fL    Neutrophils-Polys 64.20 44.00 - 72.00 %    Lymphocytes 26.00 22.00 - 41.00 %    Monocytes 5.70 0.00 - 13.40 %    Eosinophils 2.80 0.00 - 6.90 %    Basophils 0.70 0.00 - 1.80 %    Immature Granulocytes 0.60 0.00 - 0.90 %    Nucleated RBC 0.00 /100 WBC    Neutrophils (Absolute) 5.72 1.82 - 7.42 K/uL    Lymphs (Absolute) 2.31 1.00 - 4.80 K/uL    Monos (Absolute) 0.51 0.00 - 0.85 K/uL    Eos (Absolute) 0.25 0.00 - 0.51 K/uL    Baso (Absolute) 0.06 0.00 - 0.12 K/uL    Immature Granulocytes (abs) 0.05 0.00 - 0.11 K/uL    NRBC (Absolute) 0.00 K/uL   COMP METABOLIC PANEL   Result Value Ref Range    Sodium 140 135 - 145 mmol/L    Potassium 4.1 3.6 - 5.5 mmol/L    Chloride 104 96 - 112 mmol/L    Co2 22 20 - 33 mmol/L    Anion Gap 14.0 (H) 0.0 - 11.9    Glucose 131 (H) 65 - 99 mg/dL    Bun 17 8 - 22 mg/dL    Creatinine 0.77 0.50 - 1.40 mg/dL    Calcium 9.4 8.4 - 10.2 mg/dL    AST(SGOT) 13 12 - 45 U/L    ALT(SGPT) 18 2 - 50 U/L    Alkaline Phosphatase 58 30 - 99 U/L    Total Bilirubin 0.4 0.1 - 1.5 mg/dL     Albumin 4.4 3.2 - 4.9 g/dL    Total Protein 7.1 6.0 - 8.2 g/dL    Globulin 2.7 1.9 - 3.5 g/dL    A-G Ratio 1.6 g/dL   LIPASE   Result Value Ref Range    Lipase 21 7 - 58 U/L   URINALYSIS CULTURE, IF INDICATED   Result Value Ref Range    Color Yellow     Character Clear     Specific Gravity 1.020 <1.035    Ph 5.5 5.0 - 8.0    Glucose Negative Negative mg/dL    Ketones Negative Negative mg/dL    Protein Negative Negative mg/dL    Bilirubin Negative Negative    Nitrite Negative Negative    Leukocyte Esterase Negative Negative    Occult Blood Negative Negative    Micro Urine Req see below    ESTIMATED GFR   Result Value Ref Range    GFR If African American >60 >60 mL/min/1.73 m 2    GFR If Non African American >60 >60 mL/min/1.73 m 2        COURSE & MEDICAL DECISION MAKING  Pertinent Labs & Imaging studies reviewed. (See chart for details)  Patient with left-sided flank pain of uncertain etiology, last CT scan was 3 years ago that was unremarkable.  We will repeat the CT scan, check a urine, leukocytes could be musculoskeletal versus a thoracic pinched nerve versus intra-abdominal.    Patient is feeling improved after GI cocktail, CT scan was unremarkable, the patient is Ramesh on Carafate but it is the pills, will switch the patient to Carafate liquid, will start back on his PPI for a couple of weeks, follow-up with GI, have the patient return with increasing pain fevers vomiting or any other concerns.      FINAL IMPRESSION  1. Flank pain        Patient referred to primary care provider for blood pressure management    This dictation was created using voice recognition software. The accuracy of the dictation is limited to the abilities of the software. I expect there may be some errors of grammar and possibly content. The nursing notes were reviewed and certain aspects of this information were incorporated into this note.    Electronically signed by: Beni Conteh, 9/22/2019 3:45 PM

## 2019-09-23 NOTE — ED NOTES
Discharge instructions reviewed with patient; Questions/concerns addressed; Patient able to ambulate with steady gait; A&O x4; GCS 15

## 2020-08-12 ENCOUNTER — HOSPITAL ENCOUNTER (OUTPATIENT)
Dept: HOSPITAL 8 - CFH | Age: 45
Discharge: HOME | End: 2020-08-12
Attending: INTERNAL MEDICINE
Payer: COMMERCIAL

## 2020-08-12 DIAGNOSIS — E04.1: Primary | ICD-10-CM

## 2020-08-12 DIAGNOSIS — K76.9: ICD-10-CM

## 2020-08-12 PROCEDURE — 71260 CT THORAX DX C+: CPT

## 2020-08-12 PROCEDURE — 74160 CT ABDOMEN W/CONTRAST: CPT

## 2020-08-12 PROCEDURE — 71046 X-RAY EXAM CHEST 2 VIEWS: CPT

## 2022-11-12 ENCOUNTER — OFFICE VISIT (OUTPATIENT)
Dept: URGENT CARE | Facility: CLINIC | Age: 47
End: 2022-11-12
Payer: COMMERCIAL

## 2022-11-12 VITALS
WEIGHT: 275 LBS | RESPIRATION RATE: 16 BRPM | HEART RATE: 105 BPM | SYSTOLIC BLOOD PRESSURE: 146 MMHG | BODY MASS INDEX: 40.73 KG/M2 | HEIGHT: 69 IN | DIASTOLIC BLOOD PRESSURE: 90 MMHG | TEMPERATURE: 97.8 F | OXYGEN SATURATION: 95 %

## 2022-11-12 DIAGNOSIS — L08.9 LOCALIZED BACTERIAL INFECTION OF SKIN: ICD-10-CM

## 2022-11-12 DIAGNOSIS — B96.89 LOCALIZED BACTERIAL INFECTION OF SKIN: ICD-10-CM

## 2022-11-12 PROCEDURE — 99203 OFFICE O/P NEW LOW 30 MIN: CPT | Performed by: PHYSICIAN ASSISTANT

## 2022-11-12 RX ORDER — CEPHALEXIN 500 MG/1
500 CAPSULE ORAL 4 TIMES DAILY
Qty: 28 CAPSULE | Refills: 0 | Status: SHIPPED | OUTPATIENT
Start: 2022-11-12 | End: 2022-11-19

## 2022-11-12 ASSESSMENT — ENCOUNTER SYMPTOMS
EYE DISCHARGE: 0
EYE REDNESS: 0
FEVER: 0

## 2022-11-13 NOTE — PROGRESS NOTES
Subjective     Dami Brody is a 47 y.o. male who presents with Bump (Indent on R side of chest, redness, R shoulder pain, discharge from cyst X 3 weeks)            HPI  This is a new problem.  The patient presents to clinic complaining of a possible abscess to his right chest wall x2-3 weeks.  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  The patient states he continued to experience pain and soreness to the incision following this procedure.  The patient states the pain and soreness gradually migrated to the lateral aspect of his right chest wall.  The patient states his pain and soreness has progressed/worsened since onset.  The patient states he developed increased pain with associated swelling to the lateral aspect of his right chest wall x2 weeks ago.  The patient states he developed subsequent redness and increased warmth, which gradually progressed over time.  The patient states today he developed increasing pain and swelling to his right chest wall with pain radiating to his right shoulder and right upper back.  The patient states he has applied warm compresses to the affected area.  The patient states earlier today he took a hot shower and allowed the hot water to penetrate the affected area.  The patient states he then applied pressure to the area, which caused spontaneous rupture of the possible abscess.  The patient notes bloody puslike discharge/drainage from the area.  The patient states this has since resolved.  He reports no associated fever.  The patient has not taken any OTC medications for his current symptoms.    PMH:  has a past medical history of GERD (gastroesophageal reflux disease).  MEDS:   Current Outpatient Medications:     cloNIDine (CATAPRES) 0.2 MG Tab, TAKE 1 TABLET BY MOUTH TWICE A DAY AS NEEDED FOR WITHDRAWAL, Disp: , Rfl:     gabapentin (NEURONTIN) 100 MG Cap, TAKE 1-2 CAPSULES BY MOUTH THREE TIMES A DAY AS  "NEEDED, Disp: , Rfl:     oxyCODONE-acetaminophen (PERCOCET) 7.5-325 MG per tablet, , Disp: , Rfl:     oxyCODONE immediate release (ROXICODONE) 10 MG immediate release tablet, TAKE 1 TABLET EVERY DAY AS NEEDED--4-17 (Patient not taking: Reported on 11/12/2022), Disp: , Rfl:     Triamcinolone Acetonide 0.025 % Lotion, , Disp: , Rfl:     HYDROcodone Bitartrate ER 30 MG Tablet Extended Release 24 hour Abuse-Deterrent, 1 tablet (Patient not taking: Reported on 11/12/2022), Disp: , Rfl:     HYDROcodone/acetaminophen (NORCO)  MG Tab, Take 1 Tab by mouth 5 Times a Day. (Patient not taking: Reported on 11/12/2022), Disp: , Rfl:     raNITidine (ZANTAC) 150 MG Tab, Take 150 mg by mouth 2 times a day. (Patient not taking: Reported on 11/12/2022), Disp: , Rfl:     sucralfate (CARAFATE) 1 GM Tab, Take 1 g by mouth 4 Times a Day,Before Meals and at Bedtime. (Patient not taking: Reported on 11/12/2022), Disp: , Rfl:   ALLERGIES:   Allergies   Allergen Reactions    Clindamycin Rash     .  Other reaction(s): Unknown    Erythromycin Rash     .    Erythromycin Stearate      Other reaction(s): Unknown  Other reaction(s): Unknown    Iodine Vomiting     \"IV contrast makes me projectile vomit. I need IV Zofran before and then it's ok\"    Penicillins      Other reaction(s): Unknown    Septra [Bactrim] Rash     .    Sulfa Drugs Rash     .  Other reaction(s): Unknown    Tramadol      \"makes me feel sick & disoriented\"     SURGHX:   Past Surgical History:   Procedure Laterality Date    APPENDECTOMY       SOCHX:  reports that he has never smoked. He has never used smokeless tobacco. He reports current alcohol use. He reports that he does not use drugs.  FH: Family history was reviewed, no pertinent findings to report      Review of Systems   Constitutional:  Negative for fever.   HENT:  Negative for congestion.    Eyes:  Negative for discharge and redness.   Respiratory:  Negative for cough.    Gastrointestinal:  Negative for nausea and " "vomiting.   Skin:         + possible abscess right chest wall   Neurological:  Negative for headaches.            Objective     BP (!) 146/90 (BP Location: Right arm, Patient Position: Sitting, BP Cuff Size: Adult)   Pulse (!) 105   Temp 36.6 °C (97.8 °F) (Temporal)   Resp 16   Ht 1.753 m (5' 9\")   Wt 125 kg (275 lb)   SpO2 95%   BMI 40.61 kg/m²      Physical Exam  Constitutional:       General: He is not in acute distress.     Appearance: Normal appearance. He is well-developed. He is not ill-appearing.   HENT:      Head: Normocephalic and atraumatic.      Right Ear: External ear normal.      Left Ear: External ear normal.   Eyes:      Extraocular Movements: Extraocular movements intact.      Conjunctiva/sclera: Conjunctivae normal.   Cardiovascular:      Rate and Rhythm: Normal rate.   Pulmonary:      Effort: Pulmonary effort is normal.   Musculoskeletal:      Cervical back: Normal range of motion and neck supple.   Skin:     General: Skin is warm and dry.             Comments:   The patient has a localized area of erythema to the lateral aspect of the right chest wall with tenderness to palpation and increased warmth.  No edema.  No surrounding induration.  No palpable fluctuance.  No signs of lymphangitis.  A small open wound is present to the central area of erythema.  No active discharge/drainage.   Neurological:      Mental Status: He is alert and oriented to person, place, and time.                           Assessment & Plan          1. Localized bacterial infection of skin  - cephALEXin (KEFLEX) 500 MG Cap; Take 1 Capsule by mouth 4 times a day for 7 days.  Dispense: 28 Capsule; Refill: 0  - mupirocin (BACTROBAN) 2 % Ointment; Apply 1 Application topically 2 times a day for 10 days.  Dispense: 22 g; Refill: 0    The patient's presenting symptoms and physical exam findings are consistent with a localized bacterial infection of the skin.  Based on the patient's presenting symptoms and physical exam " findings, I&D was not indicated at this time.  Will prescribe the patient Keflex and Bactroban for his acute infection.  Advised the patient to monitor for worsening signs or symptoms.  Recommend OTC medications and supportive care for symptomatic management.  Recommend patient follow-up with his PCP and/or dermatologist as needed.  Discussed return precautions with the patient, and he verbalized understanding.    Differential diagnoses, supportive care, and indications for immediate follow-up discussed with patient.   Instructed to return to clinic or nearest emergency department for any change in condition, further concerns, or worsening of symptoms.       OTC Tylenol or Motrin for fever/discomfort.  Keep wound clean and dry  Apply warm compresses to the affected area  Monitor for worsening signs or symptoms  Follow-up with primary care as needed  Follow-up with dermatology as needed  Return to clinic or go to the ED if symptoms worsen or fail to improve, or if the patient should develop worsening/increasing/persistent pain/redness, swelling, increased redness warmth, discharge/drainage, fever/chills, secondary signs of infection, and/or any concerning symptoms.    Discussed plan with the patient, and he agrees to the above.    I personally reviewed prior external notes and test results pertinent to today's visit.  I have independently reviewed and interpreted all diagnostics ordered during this urgent care visit.     Please note that this dictation was created using voice recognition software. I have made every reasonable attempt to correct obvious errors, but I expect that there may be errors of grammar and possibly content that I did not discover before finalizing the note.     This note was electronically signed by Karla Doty PA-C

## 2022-11-14 ASSESSMENT — ENCOUNTER SYMPTOMS
VOMITING: 0
NAUSEA: 0
COUGH: 0
HEADACHES: 0

## 2022-12-19 ENCOUNTER — OFFICE VISIT (OUTPATIENT)
Dept: URGENT CARE | Facility: CLINIC | Age: 47
End: 2022-12-19
Payer: COMMERCIAL

## 2022-12-19 VITALS
SYSTOLIC BLOOD PRESSURE: 140 MMHG | BODY MASS INDEX: 42.8 KG/M2 | TEMPERATURE: 100.5 F | WEIGHT: 289 LBS | RESPIRATION RATE: 16 BRPM | HEIGHT: 69 IN | HEART RATE: 104 BPM | DIASTOLIC BLOOD PRESSURE: 82 MMHG | OXYGEN SATURATION: 94 %

## 2022-12-19 DIAGNOSIS — J10.1 INFLUENZA A: ICD-10-CM

## 2022-12-19 LAB
FLUAV+FLUBV AG SPEC QL IA: NORMAL
INT CON NEG: NEGATIVE
INT CON POS: POSITIVE

## 2022-12-19 PROCEDURE — 99213 OFFICE O/P EST LOW 20 MIN: CPT | Performed by: PHYSICIAN ASSISTANT

## 2022-12-19 PROCEDURE — 87804 INFLUENZA ASSAY W/OPTIC: CPT | Performed by: PHYSICIAN ASSISTANT

## 2022-12-19 RX ORDER — PROMETHAZINE HYDROCHLORIDE 6.25 MG/5ML
6.25 SYRUP ORAL EVERY 6 HOURS PRN
Qty: 118 ML | Refills: 0 | Status: SHIPPED | OUTPATIENT
Start: 2022-12-19

## 2022-12-19 RX ORDER — OXYCODONE AND ACETAMINOPHEN 10; 325 MG/1; MG/1
TABLET ORAL
COMMUNITY
Start: 2022-12-06

## 2022-12-19 RX ORDER — PREGABALIN 75 MG/1
CAPSULE ORAL
COMMUNITY
Start: 2022-12-04

## 2022-12-19 RX ORDER — LIDOCAINE 50 MG/G
OINTMENT TOPICAL
COMMUNITY
Start: 2022-11-06 | End: 2023-09-09

## 2022-12-19 RX ORDER — OSELTAMIVIR PHOSPHATE 75 MG/1
75 CAPSULE ORAL 2 TIMES DAILY
Qty: 10 CAPSULE | Refills: 0 | Status: SHIPPED | OUTPATIENT
Start: 2022-12-19 | End: 2023-09-09

## 2022-12-19 RX ORDER — SILDENAFIL 100 MG/1
TABLET, FILM COATED ORAL
COMMUNITY
Start: 2022-11-07

## 2022-12-19 RX ORDER — TAMSULOSIN HYDROCHLORIDE 0.4 MG/1
CAPSULE ORAL
COMMUNITY
Start: 2022-12-04 | End: 2023-09-09

## 2022-12-19 ASSESSMENT — ENCOUNTER SYMPTOMS
WHEEZING: 0
SINUS PAIN: 1
FEVER: 1
VOMITING: 0
COUGH: 1
HEADACHES: 1
DIARRHEA: 0
MYALGIAS: 1
SORE THROAT: 1
FATIGUE: 1
NAUSEA: 0
DIZZINESS: 0
CHILLS: 1
ABDOMINAL PAIN: 0
SHORTNESS OF BREATH: 0
SPUTUM PRODUCTION: 1

## 2022-12-19 NOTE — LETTER
December 19, 2022         Patient: Dami Brody   YOB: 1975   Date of Visit: 12/19/2022           To Whom it May Concern:    Dami Brody was seen in my clinic on 12/19/2022. He has influenza. He may return to work once he has been fever free for a full 24 hours without the use of fever reducing medications.    If you have any questions or concerns, please don't hesitate to call.        Sincerely,           Beatriz Ron P.A.-C.  Electronically Signed

## 2022-12-20 NOTE — PROGRESS NOTES
Subjective     Dami Brody is a 47 y.o. male who presents with Cough (Chills/body aches/sinus pressure/lung pain/started Saturday/neg home covid test)      Influenza  This is a new problem. The current episode started in the past 7 days (Mild symptoms started 2 nights ago but got acutely worse last night). Associated symptoms include chest pain (while coughing), chills, congestion, coughing, fatigue, a fever, headaches, myalgias and a sore throat. Pertinent negatives include no abdominal pain, nausea or vomiting. Treatments tried: tylenol, promethazine. The treatment provided mild relief.   Patient took 2 tests for COVID-19 virus yesterday, they were both negative.    Review of Systems   Constitutional:  Positive for chills, fatigue, fever and malaise/fatigue.   HENT:  Positive for congestion, sinus pain and sore throat.    Respiratory:  Positive for cough and sputum production. Negative for shortness of breath and wheezing.    Cardiovascular:  Positive for chest pain (while coughing).   Gastrointestinal:  Negative for abdominal pain, diarrhea, nausea and vomiting.   Musculoskeletal:  Positive for myalgias.   Neurological:  Positive for headaches. Negative for dizziness.           PMH:  has a past medical history of GERD (gastroesophageal reflux disease).  MEDS:   Current Outpatient Medications:     oseltamivir (TAMIFLU) 75 MG Cap, Take 1 Capsule by mouth 2 times a day., Disp: 10 Capsule, Rfl: 0    promethazine (PHENERGAN) 6.25 MG/5ML Syrup, Take 5 mL by mouth every 6 hours as needed for Nausea/Vomiting (or cough)., Disp: 118 mL, Rfl: 0    cloNIDine (CATAPRES) 0.2 MG Tab, TAKE 1 TABLET BY MOUTH TWICE A DAY AS NEEDED FOR WITHDRAWAL, Disp: , Rfl:     gabapentin (NEURONTIN) 100 MG Cap, TAKE 1-2 CAPSULES BY MOUTH THREE TIMES A DAY AS NEEDED, Disp: , Rfl:     oxyCODONE-acetaminophen (PERCOCET) 7.5-325 MG per tablet, , Disp: , Rfl:   ALLERGIES:   Allergies   Allergen Reactions    Clindamycin Rash     .  Other  "reaction(s): Unknown    Erythromycin Rash     .    Erythromycin Stearate      Other reaction(s): Unknown  Other reaction(s): Unknown    Iodine Vomiting     \"IV contrast makes me projectile vomit. I need IV Zofran before and then it's ok\"    Penicillins      Other reaction(s): Unknown    Septra [Bactrim] Rash     .    Sulfa Drugs Rash     .  Other reaction(s): Unknown    Tramadol      \"makes me feel sick & disoriented\"     SURGHX:   Past Surgical History:   Procedure Laterality Date    APPENDECTOMY       SOCHX:  reports that he has never smoked. He has never used smokeless tobacco. He reports current alcohol use. He reports that he does not use drugs.  FH: Family history was reviewed, no pertinent findings to report      Objective     BP (!) 140/82 (BP Location: Right arm, Patient Position: Sitting, BP Cuff Size: Large adult)   Pulse (!) 104   Temp (!) 38.1 °C (100.5 °F) (Temporal)   Resp 16   Ht 1.753 m (5' 9\")   Wt (!) 131 kg (289 lb)   SpO2 94%   BMI 42.68 kg/m²      Physical Exam  Constitutional:       Appearance: He is well-developed.   HENT:      Head: Normocephalic and atraumatic.      Right Ear: Tympanic membrane, ear canal and external ear normal.      Left Ear: Tympanic membrane, ear canal and external ear normal.      Nose: Mucosal edema, congestion and rhinorrhea present. Rhinorrhea is clear.      Mouth/Throat:      Lips: Pink.      Mouth: Mucous membranes are moist.      Pharynx: Oropharynx is clear.   Eyes:      Conjunctiva/sclera: Conjunctivae normal.      Pupils: Pupils are equal, round, and reactive to light.   Cardiovascular:      Rate and Rhythm: Regular rhythm. Tachycardia present.      Heart sounds: Normal heart sounds. No murmur heard.  Pulmonary:      Effort: Pulmonary effort is normal.      Breath sounds: No decreased breath sounds, wheezing, rhonchi or rales.   Musculoskeletal:      Cervical back: Normal range of motion.   Lymphadenopathy:      Cervical: No cervical adenopathy. "   Skin:     General: Skin is warm and dry.      Capillary Refill: Capillary refill takes less than 2 seconds.   Neurological:      Mental Status: He is alert and oriented to person, place, and time.   Psychiatric:         Behavior: Behavior normal.         Judgment: Judgment normal.       POCT Influenza A/B - POSITIVE for Influenza A    Assessment & Plan     1. Influenza A  - POCT Influenza A/B  - oseltamivir (TAMIFLU) 75 MG Cap; Take 1 Capsule by mouth 2 times a day.  Dispense: 10 Capsule; Refill: 0  - promethazine (PHENERGAN) 6.25 MG/5ML Syrup; Take 5 mL by mouth every 6 hours as needed for Nausea/Vomiting (or cough).  Dispense: 118 mL; Refill: 0  - OTC cold/flu medications  -Supportive care also discussed to include the use of saline nasal rinses, steam inhalation, and the use of a cool-mist humidifier in the bedroom at night.  - PO fluids  - Rest  - Tylenol or ibuprofen as needed for fever > 100.4 F            Differential Diagnosis, natural history, and supportive care discussed. Return to the Urgent Care or follow up with your PCP if symptoms fail to resolve, or for any new or worsening symptoms. Emergency room precautions discussed. Patient and/or family appears understanding of information.

## 2023-02-23 ENCOUNTER — APPOINTMENT (OUTPATIENT)
Dept: RADIOLOGY | Facility: MEDICAL CENTER | Age: 48
End: 2023-02-23
Attending: EMERGENCY MEDICINE
Payer: COMMERCIAL

## 2023-02-23 ENCOUNTER — HOSPITAL ENCOUNTER (EMERGENCY)
Facility: MEDICAL CENTER | Age: 48
End: 2023-02-23
Attending: EMERGENCY MEDICINE
Payer: COMMERCIAL

## 2023-02-23 VITALS
RESPIRATION RATE: 16 BRPM | DIASTOLIC BLOOD PRESSURE: 96 MMHG | TEMPERATURE: 98.2 F | HEIGHT: 69 IN | OXYGEN SATURATION: 94 % | HEART RATE: 89 BPM | BODY MASS INDEX: 43.1 KG/M2 | WEIGHT: 291.01 LBS | SYSTOLIC BLOOD PRESSURE: 162 MMHG

## 2023-02-23 DIAGNOSIS — K57.90 DIVERTICULOSIS: ICD-10-CM

## 2023-02-23 DIAGNOSIS — K40.91 UNILATERAL RECURRENT INGUINAL HERNIA WITHOUT OBSTRUCTION OR GANGRENE: ICD-10-CM

## 2023-02-23 DIAGNOSIS — K76.0 FATTY LIVER: ICD-10-CM

## 2023-02-23 DIAGNOSIS — R10.31 RIGHT LOWER QUADRANT ABDOMINAL PAIN: ICD-10-CM

## 2023-02-23 LAB
ALBUMIN SERPL BCP-MCNC: 4.1 G/DL (ref 3.2–4.9)
ALBUMIN/GLOB SERPL: 1.4 G/DL
ALP SERPL-CCNC: 65 U/L (ref 30–99)
ALT SERPL-CCNC: 22 U/L (ref 2–50)
ANION GAP SERPL CALC-SCNC: 13 MMOL/L (ref 7–16)
APPEARANCE UR: CLEAR
AST SERPL-CCNC: 18 U/L (ref 12–45)
BASOPHILS # BLD AUTO: 0.6 % (ref 0–1.8)
BASOPHILS # BLD: 0.06 K/UL (ref 0–0.12)
BILIRUB SERPL-MCNC: 0.4 MG/DL (ref 0.1–1.5)
BILIRUB UR QL STRIP.AUTO: NEGATIVE
BUN SERPL-MCNC: 16 MG/DL (ref 8–22)
CALCIUM ALBUM COR SERPL-MCNC: 8.9 MG/DL (ref 8.5–10.5)
CALCIUM SERPL-MCNC: 9 MG/DL (ref 8.4–10.2)
CHLORIDE SERPL-SCNC: 101 MMOL/L (ref 96–112)
CO2 SERPL-SCNC: 22 MMOL/L (ref 20–33)
COLOR UR: YELLOW
CREAT SERPL-MCNC: 1.02 MG/DL (ref 0.5–1.4)
EOSINOPHIL # BLD AUTO: 0.28 K/UL (ref 0–0.51)
EOSINOPHIL NFR BLD: 2.7 % (ref 0–6.9)
ERYTHROCYTE [DISTWIDTH] IN BLOOD BY AUTOMATED COUNT: 38.5 FL (ref 35.9–50)
GFR SERPLBLD CREATININE-BSD FMLA CKD-EPI: 92 ML/MIN/1.73 M 2
GLOBULIN SER CALC-MCNC: 2.9 G/DL (ref 1.9–3.5)
GLUCOSE SERPL-MCNC: 111 MG/DL (ref 65–99)
GLUCOSE UR STRIP.AUTO-MCNC: NEGATIVE MG/DL
HCT VFR BLD AUTO: 43 % (ref 42–52)
HGB BLD-MCNC: 14.5 G/DL (ref 14–18)
IMM GRANULOCYTES # BLD AUTO: 0.04 K/UL (ref 0–0.11)
IMM GRANULOCYTES NFR BLD AUTO: 0.4 % (ref 0–0.9)
KETONES UR STRIP.AUTO-MCNC: NEGATIVE MG/DL
LEUKOCYTE ESTERASE UR QL STRIP.AUTO: NEGATIVE
LIPASE SERPL-CCNC: 21 U/L (ref 7–58)
LYMPHOCYTES # BLD AUTO: 2.4 K/UL (ref 1–4.8)
LYMPHOCYTES NFR BLD: 23.1 % (ref 22–41)
MCH RBC QN AUTO: 28.4 PG (ref 27–33)
MCHC RBC AUTO-ENTMCNC: 33.7 G/DL (ref 33.7–35.3)
MCV RBC AUTO: 84.3 FL (ref 81.4–97.8)
MICRO URNS: NORMAL
MONOCYTES # BLD AUTO: 0.51 K/UL (ref 0–0.85)
MONOCYTES NFR BLD AUTO: 4.9 % (ref 0–13.4)
NEUTROPHILS # BLD AUTO: 7.12 K/UL (ref 1.82–7.42)
NEUTROPHILS NFR BLD: 68.3 % (ref 44–72)
NITRITE UR QL STRIP.AUTO: NEGATIVE
NRBC # BLD AUTO: 0 K/UL
NRBC BLD-RTO: 0 /100 WBC
PH UR STRIP.AUTO: 6 [PH] (ref 5–8)
PLATELET # BLD AUTO: 284 K/UL (ref 164–446)
PMV BLD AUTO: 9.7 FL (ref 9–12.9)
POTASSIUM SERPL-SCNC: 4.3 MMOL/L (ref 3.6–5.5)
PROT SERPL-MCNC: 7 G/DL (ref 6–8.2)
PROT UR QL STRIP: NEGATIVE MG/DL
RBC # BLD AUTO: 5.1 M/UL (ref 4.7–6.1)
RBC UR QL AUTO: NEGATIVE
SODIUM SERPL-SCNC: 136 MMOL/L (ref 135–145)
SP GR UR STRIP.AUTO: 1.01
WBC # BLD AUTO: 10.4 K/UL (ref 4.8–10.8)

## 2023-02-23 PROCEDURE — 96374 THER/PROPH/DIAG INJ IV PUSH: CPT

## 2023-02-23 PROCEDURE — 700111 HCHG RX REV CODE 636 W/ 250 OVERRIDE (IP): Performed by: EMERGENCY MEDICINE

## 2023-02-23 PROCEDURE — 99284 EMERGENCY DEPT VISIT MOD MDM: CPT

## 2023-02-23 PROCEDURE — 700117 HCHG RX CONTRAST REV CODE 255: Performed by: EMERGENCY MEDICINE

## 2023-02-23 PROCEDURE — 83690 ASSAY OF LIPASE: CPT

## 2023-02-23 PROCEDURE — 36415 COLL VENOUS BLD VENIPUNCTURE: CPT

## 2023-02-23 PROCEDURE — 74177 CT ABD & PELVIS W/CONTRAST: CPT

## 2023-02-23 PROCEDURE — 80053 COMPREHEN METABOLIC PANEL: CPT

## 2023-02-23 PROCEDURE — A9270 NON-COVERED ITEM OR SERVICE: HCPCS | Performed by: EMERGENCY MEDICINE

## 2023-02-23 PROCEDURE — 85025 COMPLETE CBC W/AUTO DIFF WBC: CPT

## 2023-02-23 PROCEDURE — 700102 HCHG RX REV CODE 250 W/ 637 OVERRIDE(OP): Performed by: EMERGENCY MEDICINE

## 2023-02-23 PROCEDURE — 81003 URINALYSIS AUTO W/O SCOPE: CPT

## 2023-02-23 RX ORDER — OXYCODONE HYDROCHLORIDE AND ACETAMINOPHEN 5; 325 MG/1; MG/1
1 TABLET ORAL EVERY 4 HOURS PRN
Qty: 12 TABLET | Refills: 0 | Status: SHIPPED | OUTPATIENT
Start: 2023-02-23 | End: 2023-02-26

## 2023-02-23 RX ORDER — ONDANSETRON 2 MG/ML
4 INJECTION INTRAMUSCULAR; INTRAVENOUS ONCE
Status: COMPLETED | OUTPATIENT
Start: 2023-02-23 | End: 2023-02-23

## 2023-02-23 RX ORDER — OXYCODONE HYDROCHLORIDE AND ACETAMINOPHEN 5; 325 MG/1; MG/1
1 TABLET ORAL ONCE
Status: COMPLETED | OUTPATIENT
Start: 2023-02-23 | End: 2023-02-23

## 2023-02-23 RX ADMIN — IOHEXOL 100 ML: 350 INJECTION, SOLUTION INTRAVENOUS at 18:40

## 2023-02-23 RX ADMIN — IOHEXOL 20 ML: 240 INJECTION, SOLUTION INTRATHECAL; INTRAVASCULAR; INTRAVENOUS; ORAL at 18:40

## 2023-02-23 RX ADMIN — ONDANSETRON 4 MG: 2 INJECTION INTRAMUSCULAR; INTRAVENOUS at 17:36

## 2023-02-23 RX ADMIN — OXYCODONE AND ACETAMINOPHEN 1 TABLET: 325; 5 TABLET ORAL at 18:28

## 2023-02-24 NOTE — ED TRIAGE NOTES
Chief Complaint   Patient presents with    RLQ Pain     1 month of RLQ pain and pressure, hurts when he bends over, lots of pressure in his groin. + nausea

## 2023-02-24 NOTE — ED PROVIDER NOTES
ED Provider Note    CHIEF COMPLAINT  Chief Complaint   Patient presents with    RLQ Pain     1 month of RLQ pain and pressure, hurts when he bends over, lots of pressure in his groin. + nausea       HPI  Dami Brody is a 48 y.o. male who presents for evaluation of 1 month of right lower quadrant pain.  Patient notes it started in his right low back/buttock region but is now radiated around toward his right groin and right low abdomen.  He notes sometimes the pain goes down even further but notes no hematuria or dysuria.  He has attempted to get into his physician to have this evaluated but has been inability to get an appointment.  He notes no fevers, chills, nausea, vomiting, or diarrhea.  He notes a history of an appendectomy and a hernia repair and that side when he was a child.  He is currently on no medications, has no chronic medical conditions, and has never had a colonoscopy.  EXTERNAL RECORDS REVIEWED  Outpatient Notes recent office visits in 2022 for minor injuries and illnesses and External ED Note from this facility dating and [facility] dating back to 2016.  ROS  Constitutional: No fevers or chills  Skin: No rashes  HEENT: No sore throat, runny nose  Neck: No neck pain  Chest: No pain or rashes  Pulm: No shortness of breath, cough, wheezing, stridor, or pain with inspiration/expiration  Gastrointestinal: No nausea, vomiting, diarrhea, constipation, bloating, melena, or hematochezia   Genitourinary: No dysuria or hematuria  Musculoskeletal: No pain, swelling, or weakness  Neurologic: No sensory or focal motor changes to extremities. No confusion or disorientation.  Heme: No bleeding or bruising problems.   Immuno: No hx of recurrent infections        LIMITATION TO HISTORY   Select: : None  OUTSIDE HISTORIAN(S):  None        PAST FAM HISTORY  No family history on file.    PAST MEDICAL HISTORY   has a past medical history of GERD (gastroesophageal reflux disease).    SOCIAL  "HISTORY  Social History     Tobacco Use    Smoking status: Never    Smokeless tobacco: Never   Substance and Sexual Activity    Alcohol use: Yes     Comment: Occasionally    Drug use: No    Sexual activity: Not on file       SURGICAL HISTORY   has a past surgical history that includes appendectomy.    CURRENT MEDICATIONS  Home Medications       Reviewed by Stefanie Fermin R.N. (Registered Nurse) on 02/23/23 at 1628  Med List Status: Not Addressed     Medication Last Dose Status   cloNIDine (CATAPRES) 0.2 MG Tab  Active   gabapentin (NEURONTIN) 100 MG Cap  Active   lidocaine (XYLOCAINE) 5 % Ointment  Active   oseltamivir (TAMIFLU) 75 MG Cap  Active   oxyCODONE-acetaminophen (PERCOCET) 7.5-325 MG per tablet  Active   oxyCODONE-acetaminophen (PERCOCET-10)  MG Tab  Active   pregabalin (LYRICA) 75 MG Cap  Active   promethazine (PHENERGAN) 6.25 MG/5ML Syrup  Active   sildenafil citrate (VIAGRA) 100 MG tablet  Active   tamsulosin (FLOMAX) 0.4 MG capsule  Active                     ALLERGIES  Allergies   Allergen Reactions    Clindamycin Rash     .  Other reaction(s): Unknown    Erythromycin Rash     .    Erythromycin Stearate      Other reaction(s): Unknown  Other reaction(s): Unknown    Iodine Vomiting     \"IV contrast makes me projectile vomit. I need IV Zofran before and then it's ok\"    Septra [Bactrim] Rash     .    Sulfa Drugs Rash     .  Other reaction(s): Unknown    Tramadol      \"makes me feel sick & disoriented\"       PHYSICAL EXAM  VITAL SIGNS: BP (!) 162/96   Pulse 89   Temp 36.8 °C (98.2 °F) (Temporal)   Resp 16   Ht 1.753 m (5' 9\")   Wt (!) 132 kg (291 lb 0.1 oz)   SpO2 94%   BMI 42.97 kg/m²    Gen: Alert in no apparent distress.  HEENT: No signs of trauma, Bilateral external ears normal, Nose normal. Conjunctiva  Cardiovascular: Regular rate and rhythm, no murmurs.  Capillary refill less than 3 seconds to all extremities, 2+ distal pulses.  Thorax & Lungs: Normal breath sounds, No respiratory " "distress, No wheezing bilateral chest rise  Abdomen: Bowel sounds normal, Soft, mild right lower quadrant tenderness extending into the inguinal region which is more tender over the right lower quadrant.  No masses noted., No masses, No pulsatile masses. No Guarding or rebound  Skin: Warm, Dry  Extremities: Intact distal pulses, No edema  Neurologic: Alert , no facial droop, grossly normal coordination and strength  Psychiatric: Affect pleasant    INITIAL IMPRESSION  Patient arrives for evaluation of what appears to be at least subacute pain which started in his right low back or buttock region and is now affecting him in the right inguinal and low abdomen region.  He describes no other associated symptoms but is concerned that \"something is wrong\".  He has been unable to get into his primary care physician to have this evaluated and notes that he \"almost \" when he had appendicitis and an incarcerated hernia at the same time as a child.  These are not in our records. given the findings today, I feel it is reasonable to CT image to the patient to ensure that there is no recurrent hernia or neoplastic issue, as this is his primary concern.  We will perform screening labs as well.  I feel the CT of the abdomen pelvis will be best patient evaluated with both IV and oral contrast.  Unclear whether patient will require hospitalization at this point.  LABS  Results for orders placed or performed during the hospital encounter of 23   CBC WITH DIFFERENTIAL   Result Value Ref Range    WBC 10.4 4.8 - 10.8 K/uL    RBC 5.10 4.70 - 6.10 M/uL    Hemoglobin 14.5 14.0 - 18.0 g/dL    Hematocrit 43.0 42.0 - 52.0 %    MCV 84.3 81.4 - 97.8 fL    MCH 28.4 27.0 - 33.0 pg    MCHC 33.7 33.7 - 35.3 g/dL    RDW 38.5 35.9 - 50.0 fL    Platelet Count 284 164 - 446 K/uL    MPV 9.7 9.0 - 12.9 fL    Neutrophils-Polys 68.30 44.00 - 72.00 %    Lymphocytes 23.10 22.00 - 41.00 %    Monocytes 4.90 0.00 - 13.40 %    Eosinophils 2.70 0.00 - " 6.90 %    Basophils 0.60 0.00 - 1.80 %    Immature Granulocytes 0.40 0.00 - 0.90 %    Nucleated RBC 0.00 /100 WBC    Neutrophils (Absolute) 7.12 1.82 - 7.42 K/uL    Lymphs (Absolute) 2.40 1.00 - 4.80 K/uL    Monos (Absolute) 0.51 0.00 - 0.85 K/uL    Eos (Absolute) 0.28 0.00 - 0.51 K/uL    Baso (Absolute) 0.06 0.00 - 0.12 K/uL    Immature Granulocytes (abs) 0.04 0.00 - 0.11 K/uL    NRBC (Absolute) 0.00 K/uL   COMP METABOLIC PANEL   Result Value Ref Range    Sodium 136 135 - 145 mmol/L    Potassium 4.3 3.6 - 5.5 mmol/L    Chloride 101 96 - 112 mmol/L    Co2 22 20 - 33 mmol/L    Anion Gap 13.0 7.0 - 16.0    Glucose 111 (H) 65 - 99 mg/dL    Bun 16 8 - 22 mg/dL    Creatinine 1.02 0.50 - 1.40 mg/dL    Calcium 9.0 8.4 - 10.2 mg/dL    AST(SGOT) 18 12 - 45 U/L    ALT(SGPT) 22 2 - 50 U/L    Alkaline Phosphatase 65 30 - 99 U/L    Total Bilirubin 0.4 0.1 - 1.5 mg/dL    Albumin 4.1 3.2 - 4.9 g/dL    Total Protein 7.0 6.0 - 8.2 g/dL    Globulin 2.9 1.9 - 3.5 g/dL    A-G Ratio 1.4 g/dL   LIPASE   Result Value Ref Range    Lipase 21 7 - 58 U/L   URINALYSIS    Specimen: Urine   Result Value Ref Range    Color Yellow     Character Clear     Specific Gravity 1.015 <1.035    Ph 6.0 5.0 - 8.0    Glucose Negative Negative mg/dL    Ketones Negative Negative mg/dL    Protein Negative Negative mg/dL    Bilirubin Negative Negative    Nitrite Negative Negative    Leukocyte Esterase Negative Negative    Occult Blood Negative Negative    Micro Urine Req see below    CORRECTED CALCIUM   Result Value Ref Range    Correct Calcium 8.9 8.5 - 10.5 mg/dL   ESTIMATED GFR   Result Value Ref Range    GFR (CKD-EPI) 92 >60 mL/min/1.73 m 2       RADIOLOGY  CT-ABDOMEN-PELVIS WITH   Final Result      1.  No evidence of bowel obstruction or focal inflammatory change.      2.  Sigmoid diverticulosis.      3.  Fatty liver with multiple subcentimeter cysts or hemangiomata.      4.  Small fat-containing right inguinal hernia.            COURSE & MEDICAL DECISION  MAKING  Pertinent Labs & Imaging studies reviewed. (See chart for details)  Patient's evaluation is reassuring but he does have  a small fat-containing right inguinal hernia which is likely the source of his discomfort.  As he does not have any strangulation, incarceration, or bowel obstruction, I do not feel surgical evaluation emergently is appropriate.  I feel this can safely happen as an outpatient but patient states his frustration at this plan as it takes a long time.  Regardless, there is no emergent need for surgical evaluation or inpatient evaluation otherwise.  Did state understanding of his diverticulosis as well as his liver findings.  These are nonspecific and do not require emergent or inpatient evaluation as well.  He will return if his symptoms worsen or change in any way and will contact his primary care physician for referral to a specialist.  ED observation? No    I have discussed management of the patient with the following physicians and VIJAYA's: None    Escalation of care considered, and ultimately not performed:acute inpatient care management, however at this time, the patient is most appropriate for outpatient management    Barriers to care at this time, including but not limited to: None.     Decision tools and Rx drugs considered including, but not limited to :Pain Medications PDMP      Discussion of management with other QHP or appropriate source(s): None    The patient will not drink alcohol nor drive with prescribed medications. The patient will return for worsening symptoms and is stable at the time of discharge. The patient verbalizes understanding and will comply.    FINAL IMPRESSION  1. Right lower quadrant abdominal pain    2. Fatty liver    3. Diverticulosis    4. Unilateral recurrent inguinal hernia without obstruction or gangrene        Electronically signed by: Francisco Mathews M.D., 2/23/2023 5:12 PM

## 2023-09-09 ENCOUNTER — OFFICE VISIT (OUTPATIENT)
Dept: URGENT CARE | Facility: CLINIC | Age: 48
End: 2023-09-09
Payer: COMMERCIAL

## 2023-09-09 VITALS
TEMPERATURE: 99.5 F | WEIGHT: 284.2 LBS | SYSTOLIC BLOOD PRESSURE: 146 MMHG | HEART RATE: 112 BPM | BODY MASS INDEX: 42.09 KG/M2 | RESPIRATION RATE: 16 BRPM | HEIGHT: 69 IN | DIASTOLIC BLOOD PRESSURE: 90 MMHG | OXYGEN SATURATION: 93 %

## 2023-09-09 DIAGNOSIS — J02.9 PHARYNGITIS, UNSPECIFIED ETIOLOGY: ICD-10-CM

## 2023-09-09 DIAGNOSIS — G89.29 OTHER CHRONIC PAIN: ICD-10-CM

## 2023-09-09 DIAGNOSIS — H60.542 ACUTE ECZEMATOID OTITIS EXTERNA OF LEFT EAR: ICD-10-CM

## 2023-09-09 DIAGNOSIS — U07.1 COVID-19: ICD-10-CM

## 2023-09-09 PROBLEM — K46.9 ABDOMINAL HERNIA: Status: ACTIVE | Noted: 2023-03-21

## 2023-09-09 PROCEDURE — 3077F SYST BP >= 140 MM HG: CPT | Performed by: NURSE PRACTITIONER

## 2023-09-09 PROCEDURE — 99213 OFFICE O/P EST LOW 20 MIN: CPT | Performed by: NURSE PRACTITIONER

## 2023-09-09 PROCEDURE — 3080F DIAST BP >= 90 MM HG: CPT | Performed by: NURSE PRACTITIONER

## 2023-09-09 RX ORDER — ASPIRIN 81 MG/1
81 TABLET ORAL DAILY
COMMUNITY

## 2023-09-09 RX ORDER — OXYCODONE HYDROCHLORIDE 10 MG/1
TABLET ORAL
COMMUNITY
Start: 2023-09-05

## 2023-09-09 RX ORDER — FLUOCINOLONE ACETONIDE 0.11 MG/ML
5 OIL AURICULAR (OTIC)
Qty: 20 ML | Refills: 0 | Status: SHIPPED | OUTPATIENT
Start: 2023-09-09 | End: 2023-09-23

## 2023-09-09 RX ORDER — DEXAMETHASONE SODIUM PHOSPHATE 10 MG/ML
10 INJECTION INTRAMUSCULAR; INTRAVENOUS ONCE
Status: COMPLETED | OUTPATIENT
Start: 2023-09-09 | End: 2023-09-09

## 2023-09-09 RX ORDER — PROMETHAZINE HCL 50 MG
50 TABLET ORAL EVERY 6 HOURS PRN
COMMUNITY

## 2023-09-09 RX ADMIN — DEXAMETHASONE SODIUM PHOSPHATE 10 MG: 10 INJECTION INTRAMUSCULAR; INTRAVENOUS at 16:55

## 2023-09-09 ASSESSMENT — ENCOUNTER SYMPTOMS
DIARRHEA: 0
HEADACHES: 1
HEMOPTYSIS: 0
COUGH: 1
SORE THROAT: 1
WHEEZING: 1
SHORTNESS OF BREATH: 1
SWOLLEN GLANDS: 1
VOMITING: 0
SPUTUM PRODUCTION: 1
FEVER: 1

## 2023-09-09 ASSESSMENT — FIBROSIS 4 INDEX: FIB4 SCORE: 0.62

## 2023-09-09 NOTE — PROGRESS NOTES
"  Subjective:     Dami Brody is a 48 y.o. male who presents for Coronavirus Screening (COVID + 9/8/23, pt states not feeling well)      States symptoms started late Thursday night. Tested positive for COVID on Friday. No hx of covid. C/O severe sore throat; stating it feels swollen, making it difficult to drink due to the pain. Patient inquiring about steroids.     Pharyngitis   This is a new problem. The current episode started in the past 7 days. The pain is severe. Associated symptoms include congestion, coughing, headaches, shortness of breath and swollen glands. Pertinent negatives include no diarrhea, drooling or vomiting. Treatments tried: Promethazine, tylenol, and ASA.       Past Medical History:   Diagnosis Date    GERD (gastroesophageal reflux disease)        Past Surgical History:   Procedure Laterality Date    APPENDECTOMY         Social History     Socioeconomic History    Marital status:      Spouse name: Not on file    Number of children: Not on file    Years of education: Not on file    Highest education level: Not on file   Occupational History    Not on file   Tobacco Use    Smoking status: Never    Smokeless tobacco: Never   Substance and Sexual Activity    Alcohol use: Yes     Comment: Occasionally    Drug use: No    Sexual activity: Not on file   Other Topics Concern    Not on file   Social History Narrative    Not on file     Social Determinants of Health     Financial Resource Strain: Not on file   Food Insecurity: Not on file   Transportation Needs: Not on file   Physical Activity: Not on file   Stress: Not on file   Social Connections: Not on file   Intimate Partner Violence: Not on file   Housing Stability: Not on file        No family history on file.     Allergies   Allergen Reactions    Clindamycin Rash     .  Other reaction(s): Unknown    Erythromycin Rash     .    Erythromycin Stearate      Other reaction(s): Unknown  Other reaction(s): Unknown    Iodine Vomiting     \"IV " "contrast makes me projectile vomit. I need IV Zofran before and then it's ok\"    Septra [Bactrim] Rash     .    Sulfa Drugs Rash     .  Other reaction(s): Unknown    Tramadol      \"makes me feel sick & disoriented\"       Review of Systems   Constitutional:  Positive for fever and malaise/fatigue.   HENT:  Positive for congestion and sore throat. Negative for drooling.    Respiratory:  Positive for cough, sputum production, shortness of breath and wheezing. Negative for hemoptysis.    Gastrointestinal:  Negative for diarrhea and vomiting.   Neurological:  Positive for headaches.   All other systems reviewed and are negative.       Objective:   BP (!) 146/90 (BP Location: Left arm, Patient Position: Sitting, BP Cuff Size: Large adult)   Pulse (!) 112   Temp 37.5 °C (99.5 °F) (Temporal)   Resp 16   Ht 1.753 m (5' 9\")   Wt (!) 129 kg (284 lb 3.2 oz)   SpO2 93%   BMI 41.97 kg/m²     Physical Exam  Vitals reviewed.   Constitutional:       General: He is not in acute distress.     Appearance: He is well-developed. He is ill-appearing.   HENT:      Head: Normocephalic and atraumatic.      Right Ear: Ear canal and external ear normal. Tympanic membrane is not erythematous.      Left Ear: External ear normal. No drainage. Tympanic membrane is not perforated or erythematous.      Ears:      Comments: Dry flaky left ear canal, mild ear canal. No exudate.       Nose: Congestion present.      Mouth/Throat:      Mouth: Mucous membranes are moist.      Pharynx: Posterior oropharyngeal erythema present.   Eyes:      Conjunctiva/sclera: Conjunctivae normal.   Cardiovascular:      Rate and Rhythm: Normal rate.   Pulmonary:      Effort: Pulmonary effort is normal. No tachypnea, bradypnea, accessory muscle usage, prolonged expiration, respiratory distress or retractions.      Breath sounds: Normal breath sounds. No stridor. No decreased breath sounds, wheezing, rhonchi or rales.      Comments: Cough noted. Clear " speech.  Musculoskeletal:      Cervical back: Neck supple. No edema or crepitus.   Skin:     General: Skin is warm and dry.      Findings: No rash.   Neurological:      Mental Status: He is alert and oriented to person, place, and time.      GCS: GCS eye subscore is 4. GCS verbal subscore is 5. GCS motor subscore is 6.   Psychiatric:         Speech: Speech normal.         Behavior: Behavior normal.         Thought Content: Thought content normal.         Judgment: Judgment normal.         Assessment/Plan:   1. COVID-19  - Nirmatrelvir&Ritonavir 300/100 20 x 150 MG & 10 x 100MG Tablet Therapy Pack; Take 300 mg nirmatrelvir (two 150 mg tablets) with 100 mg ritonavir (one 100 mg tablet) by mouth, with all three tablets taken together twice daily for 5 days.  Dispense: 30 Each; Refill: 0    2. Pharyngitis, unspecified etiology  - dexamethasone (Decadron) injection (check route below) 10 mg    3. Acute eczematoid otitis externa of left ear  - Fluocinolone Acetonide (DERMOTIC) 0.01 % Oil; Administer 5 Drops into affected ear(s) 2 times a day for 14 days.  Dispense: 20 mL; Refill: 0    Symptomatic care.  -Oral hydration and rest.   -Cough control: nonpharmacologic options for cough relief such as throat lozenges, hot tea, honey.  -Over the counter expectorant as directed; Guaifenesin (Mucinex).  -Tylenol or ibuprofen for pain and fever as directed.   -Warm salt water gargles.  -OTC Throat lozenges or spray (Cepacol).    Seek emergency medical care immediately for: Trouble breathing, persistent pain or pressure in the chest, confusion, inability to wake or stay awake, bluish lips or face, persistent tachycardia (fast heart rate), prolonged dizziness, persistent high grade fevers. Follow up for prolonged cough, persistent wheezing, persistent throat pain, difficulty swallowing, persistent fevers, leg swelling, or any other concerns. Follow up with your Primary Care Provider.     -Discussed viral etiology. COVID S&S, and self  isolation guidelines. S&S of PNA with follow up. Stable Vitals. Reportedly does not take Viagra routinely, advised to not take during Paxlovid therapy. Signs of canal dermatitis noted on exam. Patient reports having itchy ear for over a year. States he had previously seen an ENT for the symptoms, and there was no identified cause at the time.    Differential diagnosis, natural history, supportive care, and indications for immediate follow-up discussed.